# Patient Record
Sex: FEMALE | URBAN - METROPOLITAN AREA
[De-identification: names, ages, dates, MRNs, and addresses within clinical notes are randomized per-mention and may not be internally consistent; named-entity substitution may affect disease eponyms.]

---

## 2018-07-30 NOTE — PATIENT DISCUSSION
CATARACTS, OU - NOT VISUALLY SIGNIFICANT TO PATIENT. DISC OPTION OF [id number]. GLASSES RX GIVEN TO FILL IF DESIRES.

## 2019-03-21 NOTE — PATIENT DISCUSSION
OCULAR HYPERTENSION, OS: ELEVATED INTRAOCULAR PRESSURE. PRESCRIBED: LATANOPROST QHS OS. RETURN FOR FOLLOW-UP AS SCHEDULED.

## 2019-03-21 NOTE — PATIENT DISCUSSION
New Prescription: latanoprost (latanoprost): drops: 0.005% 1 drop at bedtime as directed into left eye 03-

## 2019-06-03 NOTE — PATIENT DISCUSSION
HORDEOLUM RIGHT EYELID AND BLEPHARITIS OU: PRESCRIBE WARM COMPRESSES BID; EYELID SCRUBS BID; ARTIFICIAL TEARS BID-QID, THE DAILY INTAKE OF OMEGA-3 FATTY ACIDS. RETURN FOR FOLLOW-UP AS SCHEDULED.

## 2019-08-26 NOTE — PATIENT DISCUSSION
POAG OS:  DISCONTINUED EYEDROPS. IOP ELEVATED AND OPTIC NERVE CHANGE. RE-START LATANOPROST QHS OS. RETURN FOR IOP CHECK.

## 2019-08-26 NOTE — PATIENT DISCUSSION
CENTRAL RETINAL VEIN OCCLUSION, OS: NO CENTRAL MACULAR EDEMA. DEFER INJECTION TODAY. RETURN AS SCHEDULED FOR AVASTIN INJECTION OS.

## 2019-08-29 NOTE — PATIENT DISCUSSION
CENTRAL RETINAL VEIN OCCLUSION, OS:  AVASTIN (1.25 MG) INTRAVITREAL INJECTION. AQUEOUS PARACENTISIS PERFORMED PRIOR TO INJECTION. RETURN AS SCHEDULED.

## 2019-08-29 NOTE — PATIENT DISCUSSION
New Prescription: timolol maleate (timolol maleate): drops: 0.5% 1 drop twice a day as directed into left eye 08-

## 2019-08-29 NOTE — PATIENT DISCUSSION
POAG, OS: ELEVATED INTRAOCULAR PRESSURE, OS. PRESCRIBED TIMOLOL 0.05%  2X/DAY OS. CONTINUE LATANOPROST QHS OS.  SCHEDULED FOR POAG EVAL WITH DR Michoacano Mijares.

## 2019-09-16 NOTE — PATIENT DISCUSSION
REFER TO DR Grisel Zabala FOR GLAUCOMA EVAL: GONIO FOR NARROW ANGLES, POSSIBLE IRIDOTOMY, SLT, OR PHACO.

## 2019-09-16 NOTE — PATIENT DISCUSSION
POAG Counseling: I have reviewed the regimen of glaucoma drops with the patient and have stressed the importance of compliance. Patient instructed to continue present medication and return for follow-up as scheduled.

## 2019-09-16 NOTE — PATIENT DISCUSSION
POAG, OS: INTRAOCULAR PRESSURE IS WITHIN ACCEPTABLE LIMITS. PT INSTRUCTED TO CONTINUE LATANOPROST QHS OS, AND TIMOLOL BID OS. RETURN FOR FOLLOW-UP AS SCHEDULED.

## 2019-09-16 NOTE — PATIENT DISCUSSION
Continue: timolol maleate (timolol maleate): drops: 0.5% 1 drop twice a day as directed into left eye 08-

## 2019-10-08 NOTE — PATIENT DISCUSSION
- No history of steroid use or ocular trauma.  Had IOP spike to the low 40s in recent months while receiving anti-VEGF injections for CRVO

## 2019-11-29 NOTE — PATIENT DISCUSSION
CENTRAL RETINAL VEIN OCCLUSION, OS:  AVASTIN (1.25 MG) INTRAVITREAL INJECTION. RETURN AS SCHEDULED. Patient was seen and examined personally by me. I have discussed the plan and reviewed the resident's note and agree with the above physical exam findings including assessment and plan except as indicated below.    Ativan taper PO for now. If doing well tmw, will consider switching to PRN ativan symptom triggered or shortening the taper.  STD screening  Elevated BP likely related to ETOH withdrawal. Will continue to monitor  SW eval

## 2020-02-13 NOTE — PATIENT DISCUSSION
POAG, OS: ELEVATED INTRAOCULAR PRESSURE, OS. CONTINUE TIMOLOL 2X/DAY OS AND LATANOPROST AT BEDTIME OS. RETURN TO DR. Vadim Wyatt FOR FOLLOW UP AS SCHEDULED.

## 2020-03-13 NOTE — PATIENT DISCUSSION
New Prescription: prednisol ace-gatiflox-bromfen (prednisol ace-gatiflox-bromfen): drops,suspension: 1-0.5-0.075% 1 drop three times a day into affected eye 03-

## 2020-03-13 NOTE — PATIENT DISCUSSION
- Will select PanOptix TFAT00 w/ LenSx OD, Monofocal IOL aiming for distance OS w/ LRI and iStent inject.  RIGHT EYE FIRST

## 2020-03-13 NOTE — PATIENT DISCUSSION
- OD appears healthy overall. OS has a history of non-ischemic CRVO and mild POAG on two drops. DO NOT RECOMMEND PREMIUM IOL FOR OS DUE TO LOSS OF CONTRAST SENSITIVITY.

## 2020-10-26 NOTE — PATIENT DISCUSSION
Continue: timolol maleate (timolol maleate): drops, once daily: 0.5% 1 drop twice a day as directed into left eye 09-

## 2020-12-29 NOTE — PATIENT DISCUSSION
- Follow up annually for [facility]

## 2021-04-06 NOTE — PATIENT DISCUSSION
New Prescription: latanoprost (latanoprost): drops: 0.005% 1 drop at bedtime as directed into both eyes 04-

## 2021-08-12 ENCOUNTER — NEW PATIENT COMPREHENSIVE (OUTPATIENT)
Dept: URBAN - METROPOLITAN AREA CLINIC 39 | Facility: CLINIC | Age: 66
End: 2021-08-12

## 2021-08-12 DIAGNOSIS — H52.4: ICD-10-CM

## 2021-08-12 DIAGNOSIS — H52.12: ICD-10-CM

## 2021-08-12 DIAGNOSIS — H52.203: ICD-10-CM

## 2021-08-12 PROCEDURE — 92015 DETERMINE REFRACTIVE STATE: CPT

## 2021-08-12 PROCEDURE — 92004 COMPRE OPH EXAM NEW PT 1/>: CPT

## 2021-08-12 ASSESSMENT — VISUAL ACUITY
OD_SC: J5
OD_CC: J1
OD_CC: 20/25
OS_CC: 20/25
OS_SC: 20/25
OD_SC: 20/20
OS_CC: J1+
OS_SC: J8

## 2021-08-12 ASSESSMENT — TONOMETRY
OD_IOP_MMHG: 13
OS_IOP_MMHG: 15

## 2021-11-24 NOTE — PATIENT DISCUSSION
Ectropion is malposition of the lower eyelid in which it turns out. This is usually due to laxity of the lower eyelid but can also have other causes. Ectropion can cause irritation to the eye, foreign body sensation, tearing, dryness to the cornea, and several other problems.

## 2021-12-16 NOTE — PATIENT DISCUSSION
RETINA IS ATTACHED OU: PVD OU; NO HOLES OR TEARS SEEN ON DILATED EXAM TODAY.  RETINAL DETACHMENT SIGNS AND SYMPTOMS REVIEWED 1 = Total assistance

## 2022-01-04 NOTE — PATIENT DISCUSSION
Will refer to Dr. Mellisa Thomas for surgical evaluation. Follow up with me in 4 months for IOP check.

## 2022-03-24 NOTE — PATIENT DISCUSSION
Posterior Vitreous Detachment: I have discussed the diagnosis of PVD with the patient and the possibility of a retinal tear or detachment. The signs/symptoms of a retinal tear/detachment were reviewed to include but not limited to redness, discharge, pain, increase or change in flashes of light, increase or change in floaters, decreased vision, part of the vision missing, veils or any other ocular concerns. I have further explained not all patients who develop a tear or detachment have notable symptoms, therefore, compliance with return visits are necessary. The patient was instructed to contact us immediately if they noticed any of the signs or symptoms of retinal detachment as noted above as the prognosis for any potential treatment options may be time related. Return for follow-up as scheduled. Not applicable

## 2022-03-25 NOTE — PATIENT DISCUSSION
Will refer to Dr. Sidra Guerra for surgical evaluation. Follow up with me in 4 months for IOP check.

## 2022-03-25 NOTE — PATIENT DISCUSSION
Will monitor. Patient currently happy with vision. Consider YAG cap soon if patient begins to notice visual changes.

## 2022-03-31 NOTE — PATIENT DISCUSSION
Central Retinal Vein Occlusion Counseling: Follow-up is necessary to evaluate for serious complications that can occur, including macular edema, formation of new, abnormal blood vessels, and elevated eye pressure. Return as scheduled to minimize chance of problems or further loss of vision from these complications. Home

## 2022-07-29 NOTE — PATIENT DISCUSSION
Will refer to Dr. Jennifer Meek for surgical evaluation. Follow up with me in 4 months for IOP check.

## 2022-11-07 NOTE — PATIENT DISCUSSION
Advised patient to continue follow-up with primary care physician for optimal control of modifiable cardiovascular risk factors.

## 2022-12-16 ENCOUNTER — COMPREHENSIVE EXAM (OUTPATIENT)
Dept: URBAN - METROPOLITAN AREA CLINIC 39 | Facility: CLINIC | Age: 67
End: 2022-12-16

## 2022-12-16 PROCEDURE — 92014 COMPRE OPH EXAM EST PT 1/>: CPT

## 2022-12-16 PROCEDURE — 92015 DETERMINE REFRACTIVE STATE: CPT

## 2022-12-16 ASSESSMENT — VISUAL ACUITY
OD_SC: J7
OS_CC: 20/20
OU_SC: 20/20
OD_CC: 20/20
OD_SC: 20/20
OU_CC: J1+
OS_SC: 20/20-2
OD_CC: J1+
OU_CC: 20/20
OU_SC: J2
OS_SC: J3
OS_CC: J1+

## 2022-12-16 ASSESSMENT — TONOMETRY
OD_IOP_MMHG: 10
OS_IOP_MMHG: 9

## 2023-01-21 NOTE — PATIENT DISCUSSION
- POD1: Va 20/200, IOP 20, 1+ cell, 1+ stromal edema, looks great [FreeTextEntry1] : if worsens ear pain green mucous start ABX\par if Sob or tachypnea or distress to go to ER\par saline in nebulizer\par

## 2023-12-18 ENCOUNTER — COMPREHENSIVE EXAM (OUTPATIENT)
Dept: URBAN - METROPOLITAN AREA CLINIC 39 | Facility: CLINIC | Age: 68
End: 2023-12-18

## 2023-12-18 DIAGNOSIS — H52.4: ICD-10-CM

## 2023-12-18 DIAGNOSIS — H52.12: ICD-10-CM

## 2023-12-18 DIAGNOSIS — H52.203: ICD-10-CM

## 2023-12-18 PROCEDURE — 92014 COMPRE OPH EXAM EST PT 1/>: CPT

## 2023-12-18 PROCEDURE — 92015 DETERMINE REFRACTIVE STATE: CPT

## 2023-12-18 ASSESSMENT — VISUAL ACUITY
OS_CC: 20/20-1
OU_CC: J1+
OD_CC: J1
OS_CC: J1+
OD_CC: 20/20
OU_CC: 20/20

## 2023-12-18 ASSESSMENT — TONOMETRY
OS_IOP_MMHG: 14
OD_IOP_MMHG: 14

## 2024-05-28 NOTE — PATIENT DISCUSSION
Patient going into urgent care.   Omar Alejo, MESERETN     Taper Tobradex: TID for a week, BID for a week, QD for a week, then stop.

## 2024-12-18 ENCOUNTER — COMPREHENSIVE EXAM (OUTPATIENT)
Age: 69
End: 2024-12-18

## 2024-12-18 DIAGNOSIS — H52.12: ICD-10-CM

## 2024-12-18 DIAGNOSIS — H52.4: ICD-10-CM

## 2024-12-18 DIAGNOSIS — H52.203: ICD-10-CM

## 2024-12-18 PROCEDURE — 92015 DETERMINE REFRACTIVE STATE: CPT

## 2024-12-18 PROCEDURE — 92014 COMPRE OPH EXAM EST PT 1/>: CPT

## 2025-05-30 NOTE — PATIENT DISCUSSION
- Will establish glaucoma stage and goal IOP before considering cataract surgery.  Angle currently open OU No